# Patient Record
Sex: MALE | Race: ASIAN | URBAN - METROPOLITAN AREA
[De-identification: names, ages, dates, MRNs, and addresses within clinical notes are randomized per-mention and may not be internally consistent; named-entity substitution may affect disease eponyms.]

---

## 2018-11-09 ENCOUNTER — THERAPY VISIT (OUTPATIENT)
Dept: PHYSICAL THERAPY | Facility: CLINIC | Age: 33
End: 2018-11-09
Payer: COMMERCIAL

## 2018-11-09 DIAGNOSIS — M25.511 CHRONIC RIGHT SHOULDER PAIN: Primary | ICD-10-CM

## 2018-11-09 DIAGNOSIS — G89.29 CHRONIC RIGHT SHOULDER PAIN: Primary | ICD-10-CM

## 2018-11-09 PROCEDURE — 97161 PT EVAL LOW COMPLEX 20 MIN: CPT | Mod: GP | Performed by: PHYSICAL THERAPIST

## 2018-11-09 PROCEDURE — 97530 THERAPEUTIC ACTIVITIES: CPT | Mod: GP | Performed by: PHYSICAL THERAPIST

## 2018-11-09 PROCEDURE — 97110 THERAPEUTIC EXERCISES: CPT | Mod: GP | Performed by: PHYSICAL THERAPIST

## 2018-11-09 NOTE — LETTER
[facility] ATHLETIC [facility] PHYSICAL THERAPY   [address] [address]  [phone]    2018    Re: Mao Zacarias   :   1985  MRN:  [id number]   REFERRING PHYSICIAN:   Aren Carey DO    [facility] ATHLETIC [facility] PHYSICAL [facility]  Date of Initial Evaluation:  18  Visits:  Rxs Used: 1  Reason for Referral:  Chronic right shoulder pain    EVALUATION SUMMARY    [facility]    Subjective:  Patient is a 33 year old male presenting with rehab right shoulder hpi.   Mao Zacarias is a 33 year old male with a right shoulder condition.  Condition occurred with:  Lifting and repetition/overuse (Pt. had an onset of R sh pain 6 months ago w/out specific incident. He was doing alot of rock climbing and upper body workouts at that time. He has since stopped. He went thru 2-3 months of PT this summer w/out success. A recent MRI shows a labral tear. ).  Condition occurred: during recreation/sport.  This is a chronic condition  18.    Patient reports pain:  In the joint and posterior.  Radiates to:  Upper arm.  Pain is described as aching and is intermittent and reported as 4/10.  Associated symptoms:  Loss of strength. Pain is the same all the time.  Symptoms are exacerbated by lifting and using arm overhead and relieved by rest.  Since onset symptoms are gradually improving.  Special tests:  MRI.  Previous treatment includes physical therapy.  There was no improvement following previous treatment.  General health as reported by patient is good.   Current occupation is Accounting and operations.    Primary job tasks include:  Prolonged sitting, repetitive tasks and other (computer).             Objective:  Standing Alignment:    Shoulder/UE:  Normal    Flexibility/Screens:   Positive screens:  Shoulder    Shoulder Evaluation:  ROM:  AROM:    Flexion:  Left:  170    Right:  170  Abduction:  Left: 170   Right:   165  Internal Rotation:  Left:  87    Right:  80  External Rotation:  Left:  95    Right:  80    Strength:    Flexion: Right: 4/5     Pain:   Extension:  Right: 4-/5    Pain:  Abduction:  Right: 4-/5     Pain:  Internal Rotation:  Right: 5/5     Pain:  External Rotation:   Right:4+/5     Pain:    Horizontal Abduction:  Right:4-/5    Pain:  Re: Amar Zacarias       Stability Testing:  normal    Special Tests:    Right shoulder positive for the following special tests:Labral    Palpation:    Right shoulder tenderness present at: Supraspinatus and Infraspinatus    Mobility Tests:  normal    Assessment/Plan:    Patient is a 33 year old male with right side shoulder complaints.    Patient has the following significant findings with corresponding treatment plan.                Diagnosis 1:  R shoulder pain/labral tear  Pain -  self management and education  Decreased strength - therapeutic exercise and therapeutic activities  Impaired muscle performance - neuro re-education  Decreased function - therapeutic activities    Therapy Evaluation Codes:   1) History comprised of:   Personal factors that impact the plan of care:      Time since onset of symptoms.    Comorbidity factors that impact the plan of care are:      None.     Medications impacting care: None.  2) Examination of Body Systems comprised of:   Body structures and functions that impact the plan of care:      Shoulder.   Activity limitations that impact the plan of care are:      Lifting.  3) Clinical presentation characteristics are:   Stable/Uncomplicated.  4) Decision-Making    Low complexity using standardized patient assessment instrument and/or measureable assessment of functional outcome.  Cumulative Therapy Evaluation is: Low complexity.    Previous and current functional limitations:  (See Goal Flow Sheet for this information)    Short term and Long term goals: (See Goal Flow Sheet for this information)     Communication ability:  Patient appears to be  able to clearly communicate and understand verbal and written communication and follow directions correctly.  Treatment Explanation - The following has been discussed with the patient:   RX ordered/plan of care  Anticipated outcomes  Possible risks and side effects  This patient would benefit from PT intervention to resume normal activities.   Rehab potential is good.    Frequency:  1 X a month, once daily  Duration:  for 3 months  Discharge Plan:  Achieve all LTG.  Independent in home treatment program.  Reach maximal therapeutic benefit.    Re: Mao Zacarias       Please refer to the daily flowsheet for treatment today, total treatment time and time spent performing 1:1 timed codes.     Thank you for your referral.    INQUIRIES  Therapist: Inna Liang, PT  [facility] PHYSICAL THERAPY  [address] [place] [id number]  Phone: [phone]  Fax: [phone]

## 2018-11-09 NOTE — MR AVS SNAPSHOT
"              After Visit Summary   2018    Mao Zacarias    MRN: [id number]           Patient Information     Date Of Birth          1985        Visit Information        Provider Department      2018 5:20 PM Inna Liang PT [facility] Athletic [facility] Physical Therapy        Today's Diagnoses     Chronic right shoulder pain    -  1       Follow-ups after your visit        Who to contact     If you have questions or need follow up information about today's clinic visit or your schedule please [facility] directly at [phone].  Normal or non-critical lab and imaging results will be communicated to you by [employer], letter or phone within 4 business days after the clinic has received the results. If you do not hear from us within 7 days, please contact the clinic through [employer] or phone. If you have a critical or abnormal lab result, we will notify you by phone as soon as possible.  Submit refill requests through [employer] or call your pharmacy and they will forward the refill request to us. Please allow 3 business days for your refill to be completed.          Additional Information About Your Visit        MyChart Information     [employer] lets you send messages to your doctor, view your test results, renew your prescriptions, schedule appointments and more. To sign up, go to [email or web address] . Click on \"Log in\" on the left side of the screen, which will take you to the Welcome page. Then click on \"Sign up Now\" on the right side of the page.     You will be asked to enter the access code listed below, as well as some personal information. Please follow the directions to create your username and password.     Your access code is: [email or web address]  Expires: 2019 10:54 AM     Your access code will  in 90 days. If you need help or a new code, please call your [facility] clinic or [phone].        Care EveryWhere ID  "    This is your Care EveryWhere ID. This could be used by other organizations to access your [facility] medical records  [id number]         Blood Pressure from Last 3 Encounters:   No data found for BP    Weight from Last 3 Encounters:   No data found for Wt              We Performed the Following     DANIELLE Inital Eval Report     PT Eval, Low Complexity (77099)     Therapeutic Activities     Therapeutic Exercises        Primary Care Provider    None Specified       No primary provider on file.        Equal Access to Services     [facility]: Hadii aad ku hadasho Soomaali, waaxda luqadaha, qaybta kaalmada adeegyada, waxay idiin hayaan adechandrakant wilsonmendellolis lavickyn . So [facility] [phone].    ATENCIÓN: Si habla español, tiene a yoon disposición servicios gratuitos de asistencia lingüística. Llame al [phone].    We comply with applicable federal civil rights laws and [place] laws. We do not discriminate on the basis of race, color, national origin, age, disability, sex, sexual orientation, or gender identity.            Thank you!     Thank you for choosing [facility] PHYSICAL THERAPY  for your care. Our goal is always to provide you with excellent care. Hearing back from our patients is one way we can continue to improve our services. Please take a few minutes to complete the written survey that you may receive in the mail after your visit with us. Thank you!             Your Updated Medication List - Protect others around you: Learn how to safely use, store and throw away your medicines at www.disposemymeds.org.      Notice  As of 11/9/2018 11:59 PM    You have not been prescribed any medications.

## 2018-11-10 PROBLEM — M25.511 CHRONIC RIGHT SHOULDER PAIN: Status: ACTIVE | Noted: 2018-11-10

## 2018-11-10 PROBLEM — G89.29 CHRONIC RIGHT SHOULDER PAIN: Status: ACTIVE | Noted: 2018-11-10

## 2018-11-10 NOTE — PROGRESS NOTES
[facility] Initial Evaluation        Subjective:  Patient is a 33 year old male presenting with rehab right shoulder hpi.   Mao Zacarias is a 33 year old male with a right shoulder condition.  Condition occurred with:  Lifting and repetition/overuse (Pt. had an onset of R sh pain 6 months ago w/out specific incident. He was doing alot of rock climbing and upper body workouts at that time. He has since stopped. He went thru 2-3 months of PT this summer w/out success. A recent MRI shows a labral tear. ).  Condition occurred: during recreation/sport.  This is a chronic condition  5-9-18.    Patient reports pain:  In the joint and posterior.  Radiates to:  Upper arm.  Pain is described as aching and is intermittent and reported as 4/10.  Associated symptoms:  Loss of strength. Pain is the same all the time.  Symptoms are exacerbated by lifting and using arm overhead and relieved by rest.  Since onset symptoms are gradually improving.  Special tests:  MRI.  Previous treatment includes physical therapy.  There was no improvement following previous treatment.  General health as reported by patient is good.                                              Objective:  Standing Alignment:      Shoulder/UE:  Normal                  Flexibility/Screens:   Positive screens:  Shoulder                             Shoulder Evaluation:  ROM:  AROM:    Flexion:  Left:  170    Right:  170    Abduction:  Left: 170   Right:  165    Internal Rotation:  Left:  87    Right:  80  External Rotation:  Left:  95    Right:  80                      Strength:    Flexion: Right: 4/5     Pain:   Extension:  Right: 4-/5    Pain:  Abduction:  Right: 4-/5     Pain:    Internal Rotation:  Right: 5/5     Pain:  External Rotation:   Right:4+/5     Pain:    Horizontal Abduction:  Right:4-/5    Pain:        Stability Testing:  normal      Special Tests:      Right shoulder positive for the following special tests:Labral  Palpation:       Right shoulder tenderness present at: Supraspinatus and Infraspinatus  Mobility Tests:  normal                                                 General     ROS    Assessment/Plan:    Patient is a 33 year old male with right side shoulder complaints.    Patient has the following significant findings with corresponding treatment plan.                Diagnosis 1:  R shoulder pain/labral tear  Pain -  self management and education  Decreased strength - therapeutic exercise and therapeutic activities  Impaired muscle performance - neuro re-education  Decreased function - therapeutic activities    Therapy Evaluation Codes:   1) History comprised of:   Personal factors that impact the plan of care:      Time since onset of symptoms.    Comorbidity factors that impact the plan of care are:      None.     Medications impacting care: None.  2) Examination of Body Systems comprised of:   Body structures and functions that impact the plan of care:      Shoulder.   Activity limitations that impact the plan of care are:      Lifting.  3) Clinical presentation characteristics are:   Stable/Uncomplicated.  4) Decision-Making    Low complexity using standardized patient assessment instrument and/or measureable assessment of functional outcome.  Cumulative Therapy Evaluation is: Low complexity.    Previous and current functional limitations:  (See Goal Flow Sheet for this information)    Short term and Long term goals: (See Goal Flow Sheet for this information)     Communication ability:  Patient appears to be able to clearly communicate and understand verbal and written communication and follow directions correctly.  Treatment Explanation - The following has been discussed with the patient:   RX ordered/plan of care  Anticipated outcomes  Possible risks and side effects  This patient would benefit from PT intervention to resume normal activities.   Rehab potential is good.    Frequency:  1 X a month, once daily  Duration:  for 3  months  Discharge Plan:  Achieve all LTG.  Independent in home treatment program.  Reach maximal therapeutic benefit.    Please refer to the daily flowsheet for treatment today, total treatment time and time spent performing 1:1 timed codes.

## 2019-01-07 ENCOUNTER — THERAPY VISIT (OUTPATIENT)
Dept: PHYSICAL THERAPY | Facility: CLINIC | Age: 34
End: 2019-01-07
Payer: COMMERCIAL

## 2019-01-07 DIAGNOSIS — M25.511 CHRONIC RIGHT SHOULDER PAIN: ICD-10-CM

## 2019-01-07 DIAGNOSIS — G89.29 CHRONIC RIGHT SHOULDER PAIN: ICD-10-CM

## 2019-01-07 PROCEDURE — 97530 THERAPEUTIC ACTIVITIES: CPT | Mod: GP | Performed by: PHYSICAL THERAPIST

## 2019-01-07 PROCEDURE — 97110 THERAPEUTIC EXERCISES: CPT | Mod: GP | Performed by: PHYSICAL THERAPIST

## 2019-01-07 NOTE — LETTER
[facility] ATHLETIC [facility] PHYSICAL THERAPY   [address] [address]  [phone]    2019    Re: Mao Zacarias   :   1985  MRN:  [id number]   REFERRING PHYSICIAN:   Aren Carey    [facility] ATHLETIC [facility] PHYSICAL [facility]  Date of Initial Evaluation:  18  Visits:  Rxs Used: 2  Reason for Referral:  Chronic right shoulder pain      DISCHARGE REPORT  Progress reporting period is from 18 to 19.       SUBJECTIVE  Subjective changes noted by patient:   Pt. has progressed well over the past 2 months with doing his home exercises for the R shoulder. Pt. reports no pain anymore however he has not resumed his workouts at the gym as of yet. Pt. will continue his HEP 1-2 x week and plans to resume going to the gym 3 x week starting this week.      Current pain level is  0/10.     Previous pain level was  4/10.   Changes in function:  Yes (See Goal flowsheet attached for changes in current functional level)  Adverse reaction to treatment or activity: None    OBJECTIVE  Changes noted in objective findings:  Strength R sh flex 4+/5; abd 4+/5; ER 5/5; hor abd 4/5; ext 4+/5     ASSESSMENT/PLAN  Updated problem list and treatment plan: Diagnosis 1:  R shoulder pain  Pain -  self management and education  Decreased strength - therapeutic exercise and therapeutic activities  Impaired muscle performance - neuro re-education  Decreased function - therapeutic activities  STG/LTGs have been met or progress has been made towards goals:  Yes (See Goal flow sheet completed today.)  Assessment of Progress: The patient has met all of their long term goals.  Self Management Plans:  Patient is independent in a home treatment program.  Patient is independent in self management of symptoms.  I have re-evaluated this patient and find that the nature, scope, duration and intensity of the therapy is appropriate for the medical condition of the  patient.  Mao continues to require the following intervention to meet STG and LTG's:  PT intervention is no longer required to meet STG/LTG.    Recommendations:  This patient is ready to be discharged from therapy and continue their home treatment program.    Please refer to the daily flowsheet for treatment today, total treatment time and time spent performing 1:1 timed codes.          Thank you for your referral.    INQUIRIES  Therapist: Inna Liang PT   [facility] PHYSICAL THERAPY  [address] [place] [id number]  Phone: [phone]  Fax: [phone]

## 2019-01-08 NOTE — PROGRESS NOTES
Subjective:  HPI                    Objective:  System    Physical Exam    General     ROS    Assessment/Plan:    DISCHARGE REPORT    Progress reporting period is from 11-9-18 to 1-7-19.       SUBJECTIVE  Subjective changes noted by patient:   Pt. has progressed well over the past 2 months with doing his home exercises for the R shoulder. Pt. reports no pain anymore however he has not resumed his workouts at the gym as of yet. Pt. will continue his HEP 1-2 x week and plans to resume going to the gym 3 x week starting this week.      Current pain level is  0/10.     Previous pain level was  4/10.   Changes in function:  Yes (See Goal flowsheet attached for changes in current functional level)  Adverse reaction to treatment or activity: None    OBJECTIVE  Changes noted in objective findings:  Strength R sh flex 4+/5; abd 4+/5; ER 5/5; hor abd 4/5; ext 4+/5     ASSESSMENT/PLAN  Updated problem list and treatment plan: Diagnosis 1:  R shoulder pain  Pain -  self management and education  Decreased strength - therapeutic exercise and therapeutic activities  Impaired muscle performance - neuro re-education  Decreased function - therapeutic activities  STG/LTGs have been met or progress has been made towards goals:  Yes (See Goal flow sheet completed today.)  Assessment of Progress: The patient has met all of their long term goals.  Self Management Plans:  Patient is independent in a home treatment program.  Patient is independent in self management of symptoms.  I have re-evaluated this patient and find that the nature, scope, duration and intensity of the therapy is appropriate for the medical condition of the patient.  Amar continues to require the following intervention to meet STG and LTG's:  PT intervention is no longer required to meet STG/LTG.    Recommendations:  This patient is ready to be discharged from therapy and continue their home treatment program.    Please refer to the daily flowsheet for treatment  today, total treatment time and time spent performing 1:1 timed codes.